# Patient Record
Sex: MALE | Race: WHITE | Employment: FULL TIME | URBAN - METROPOLITAN AREA
[De-identification: names, ages, dates, MRNs, and addresses within clinical notes are randomized per-mention and may not be internally consistent; named-entity substitution may affect disease eponyms.]

---

## 2021-08-25 ENCOUNTER — APPOINTMENT (OUTPATIENT)
Dept: GENERAL RADIOLOGY | Age: 51
End: 2021-08-25
Attending: EMERGENCY MEDICINE
Payer: COMMERCIAL

## 2021-08-25 ENCOUNTER — HOSPITAL ENCOUNTER (OUTPATIENT)
Age: 51
Setting detail: OBSERVATION
Discharge: HOME | End: 2021-08-26
Attending: STUDENT IN AN ORGANIZED HEALTH CARE EDUCATION/TRAINING PROGRAM | Admitting: INTERNAL MEDICINE
Payer: COMMERCIAL

## 2021-08-25 DIAGNOSIS — R07.9 CHEST PAIN, UNSPECIFIED TYPE: Primary | ICD-10-CM

## 2021-08-25 DIAGNOSIS — R06.02 SOB (SHORTNESS OF BREATH): ICD-10-CM

## 2021-08-25 LAB
ALBUMIN SERPL-MCNC: 3.8 G/DL (ref 3.5–5)
ALBUMIN/GLOB SERPL: 1.2 {RATIO} (ref 1.1–2.2)
ALP SERPL-CCNC: 83 U/L (ref 45–117)
ALT SERPL-CCNC: 55 U/L (ref 12–78)
ANION GAP SERPL CALC-SCNC: 5 MMOL/L (ref 5–15)
AST SERPL W P-5'-P-CCNC: 17 U/L (ref 15–37)
BASOPHILS # BLD: 0.1 K/UL (ref 0–0.1)
BASOPHILS NFR BLD: 1 % (ref 0–1)
BILIRUB SERPL-MCNC: 0.4 MG/DL (ref 0.2–1)
BUN SERPL-MCNC: 11 MG/DL (ref 6–20)
BUN/CREAT SERPL: 13 (ref 12–20)
CA-I BLD-MCNC: 9.1 MG/DL (ref 8.5–10.1)
CHLORIDE SERPL-SCNC: 105 MMOL/L (ref 97–108)
CO2 SERPL-SCNC: 27 MMOL/L (ref 21–32)
CREAT SERPL-MCNC: 0.84 MG/DL (ref 0.7–1.3)
DIFFERENTIAL METHOD BLD: ABNORMAL
EOSINOPHIL # BLD: 0.4 K/UL (ref 0–0.4)
EOSINOPHIL NFR BLD: 5 % (ref 0–7)
ERYTHROCYTE [DISTWIDTH] IN BLOOD BY AUTOMATED COUNT: 11.9 % (ref 11.5–14.5)
GLOBULIN SER CALC-MCNC: 3.2 G/DL (ref 2–4)
GLUCOSE BLD STRIP.AUTO-MCNC: 357 MG/DL (ref 65–117)
GLUCOSE SERPL-MCNC: 326 MG/DL (ref 65–100)
HCT VFR BLD AUTO: 42.2 % (ref 36.6–50.3)
HGB BLD-MCNC: 14.4 G/DL (ref 12.1–17)
IMM GRANULOCYTES # BLD AUTO: 0 K/UL (ref 0–0.04)
IMM GRANULOCYTES NFR BLD AUTO: 1 % (ref 0–0.5)
LYMPHOCYTES # BLD: 2.6 K/UL (ref 0.8–3.5)
LYMPHOCYTES NFR BLD: 32 % (ref 12–49)
MCH RBC QN AUTO: 31 PG (ref 26–34)
MCHC RBC AUTO-ENTMCNC: 34.1 G/DL (ref 30–36.5)
MCV RBC AUTO: 90.9 FL (ref 80–99)
MONOCYTES # BLD: 0.8 K/UL (ref 0–1)
MONOCYTES NFR BLD: 10 % (ref 5–13)
NEUTS SEG # BLD: 4.1 K/UL (ref 1.8–8)
NEUTS SEG NFR BLD: 51 % (ref 32–75)
NRBC # BLD: 0 K/UL (ref 0–0.01)
NRBC BLD-RTO: 0 PER 100 WBC
PERFORMED BY, TECHID: ABNORMAL
PLATELET # BLD AUTO: 199 K/UL (ref 150–400)
PMV BLD AUTO: 11 FL (ref 8.9–12.9)
POTASSIUM SERPL-SCNC: 4.2 MMOL/L (ref 3.5–5.1)
PROT SERPL-MCNC: 7 G/DL (ref 6.4–8.2)
RBC # BLD AUTO: 4.64 M/UL (ref 4.1–5.7)
SODIUM SERPL-SCNC: 137 MMOL/L (ref 136–145)
TROPONIN I SERPL-MCNC: <0.05 NG/ML
TROPONIN I SERPL-MCNC: <0.05 NG/ML
WBC # BLD AUTO: 7.9 K/UL (ref 4.1–11.1)

## 2021-08-25 PROCEDURE — 93005 ELECTROCARDIOGRAM TRACING: CPT

## 2021-08-25 PROCEDURE — 99284 EMERGENCY DEPT VISIT MOD MDM: CPT

## 2021-08-25 PROCEDURE — 74011250637 HC RX REV CODE- 250/637: Performed by: STUDENT IN AN ORGANIZED HEALTH CARE EDUCATION/TRAINING PROGRAM

## 2021-08-25 PROCEDURE — 84484 ASSAY OF TROPONIN QUANT: CPT

## 2021-08-25 PROCEDURE — 99218 HC RM OBSERVATION: CPT

## 2021-08-25 PROCEDURE — 85025 COMPLETE CBC W/AUTO DIFF WBC: CPT

## 2021-08-25 PROCEDURE — 74011636637 HC RX REV CODE- 636/637: Performed by: INTERNAL MEDICINE

## 2021-08-25 PROCEDURE — 80053 COMPREHEN METABOLIC PANEL: CPT

## 2021-08-25 PROCEDURE — 82962 GLUCOSE BLOOD TEST: CPT

## 2021-08-25 PROCEDURE — 36415 COLL VENOUS BLD VENIPUNCTURE: CPT

## 2021-08-25 PROCEDURE — 71046 X-RAY EXAM CHEST 2 VIEWS: CPT

## 2021-08-25 PROCEDURE — 65270000029 HC RM PRIVATE

## 2021-08-25 PROCEDURE — 74011250637 HC RX REV CODE- 250/637: Performed by: INTERNAL MEDICINE

## 2021-08-25 RX ORDER — MAGNESIUM SULFATE 100 %
4 CRYSTALS MISCELLANEOUS AS NEEDED
Status: DISCONTINUED | OUTPATIENT
Start: 2021-08-25 | End: 2021-08-27 | Stop reason: HOSPADM

## 2021-08-25 RX ORDER — ATORVASTATIN CALCIUM 20 MG/1
20 TABLET, FILM COATED ORAL DAILY
Status: DISCONTINUED | OUTPATIENT
Start: 2021-08-26 | End: 2021-08-27 | Stop reason: HOSPADM

## 2021-08-25 RX ORDER — METFORMIN HYDROCHLORIDE 500 MG/1
1000 TABLET ORAL 2 TIMES DAILY WITH MEALS
Status: DISCONTINUED | OUTPATIENT
Start: 2021-08-26 | End: 2021-08-27 | Stop reason: HOSPADM

## 2021-08-25 RX ORDER — PANTOPRAZOLE SODIUM 40 MG/1
40 TABLET, DELAYED RELEASE ORAL
Status: DISCONTINUED | OUTPATIENT
Start: 2021-08-26 | End: 2021-08-27 | Stop reason: HOSPADM

## 2021-08-25 RX ORDER — IBUPROFEN 800 MG/1
800 TABLET ORAL
Status: COMPLETED | OUTPATIENT
Start: 2021-08-25 | End: 2021-08-25

## 2021-08-25 RX ORDER — SODIUM CHLORIDE 0.9 % (FLUSH) 0.9 %
5-40 SYRINGE (ML) INJECTION AS NEEDED
Status: DISCONTINUED | OUTPATIENT
Start: 2021-08-25 | End: 2021-08-27 | Stop reason: HOSPADM

## 2021-08-25 RX ORDER — ATORVASTATIN CALCIUM 20 MG/1
20 TABLET, FILM COATED ORAL DAILY
COMMUNITY

## 2021-08-25 RX ORDER — SODIUM CHLORIDE 0.9 % (FLUSH) 0.9 %
5-40 SYRINGE (ML) INJECTION EVERY 8 HOURS
Status: DISCONTINUED | OUTPATIENT
Start: 2021-08-25 | End: 2021-08-27 | Stop reason: HOSPADM

## 2021-08-25 RX ORDER — ENOXAPARIN SODIUM 100 MG/ML
40 INJECTION SUBCUTANEOUS EVERY 24 HOURS
Status: DISCONTINUED | OUTPATIENT
Start: 2021-08-25 | End: 2021-08-27 | Stop reason: HOSPADM

## 2021-08-25 RX ORDER — TRAZODONE HYDROCHLORIDE 50 MG/1
50 TABLET ORAL
Status: DISCONTINUED | OUTPATIENT
Start: 2021-08-25 | End: 2021-08-27 | Stop reason: HOSPADM

## 2021-08-25 RX ORDER — OMEPRAZOLE 20 MG/1
20 CAPSULE, DELAYED RELEASE ORAL DAILY
COMMUNITY

## 2021-08-25 RX ORDER — INSULIN LISPRO 100 [IU]/ML
INJECTION, SOLUTION INTRAVENOUS; SUBCUTANEOUS
Status: DISCONTINUED | OUTPATIENT
Start: 2021-08-25 | End: 2021-08-27 | Stop reason: HOSPADM

## 2021-08-25 RX ORDER — METFORMIN HYDROCHLORIDE 1000 MG/1
1000 TABLET ORAL 2 TIMES DAILY WITH MEALS
COMMUNITY

## 2021-08-25 RX ORDER — DEXTROSE 50 % IN WATER (D50W) INTRAVENOUS SYRINGE
25-50 AS NEEDED
Status: DISCONTINUED | OUTPATIENT
Start: 2021-08-25 | End: 2021-08-27 | Stop reason: HOSPADM

## 2021-08-25 RX ORDER — LANOLIN ALCOHOL/MO/W.PET/CERES
3 CREAM (GRAM) TOPICAL
Status: DISCONTINUED | OUTPATIENT
Start: 2021-08-25 | End: 2021-08-27 | Stop reason: HOSPADM

## 2021-08-25 RX ADMIN — IBUPROFEN 800 MG: 800 TABLET, FILM COATED ORAL at 16:33

## 2021-08-25 RX ADMIN — MELATONIN TAB 3 MG 3 MG: 3 TAB at 22:10

## 2021-08-25 RX ADMIN — TRAZODONE HYDROCHLORIDE 50 MG: 50 TABLET ORAL at 22:10

## 2021-08-25 RX ADMIN — Medication 10 ML: at 22:16

## 2021-08-25 RX ADMIN — INSULIN LISPRO 15 UNITS: 100 INJECTION, SOLUTION INTRAVENOUS; SUBCUTANEOUS at 22:15

## 2021-08-25 NOTE — ED PROVIDER NOTES
EMERGENCY DEPARTMENT HISTORY AND PHYSICAL EXAM      Date: 8/25/2021  Patient Name: Shaina Iglesias      History of Presenting Illness     Chief Complaint   Patient presents with    Chest Pain       History Provided By: Patient    HPI: Shaina Iglesias, 46 y.o. male with past medical history of DM, HLD, and chronic smoking who presents emerged department with 1 week worsening chest pain. Pain is episodic, feels like tightness, midsternal, nothing specific makes it better or worse with no associated nausea, vomiting or diaphoresis. However, patient reports that he has been having worsening exertional dyspnea over the last 4 days. Patient reports that he has strong family history of cardiac disease. Of note, patient reports that he has been under a lot of stress with his job. He recently translocated from [place] to [place] to work for his current company. Patient is denying fever, coughing, night sweats or chills. He did receive his Covid vaccines and there are no concerns for infectious etiology. There are no other complaints, changes, or physical findings at this time. PCP: No primary care provider on file. Past History     Past Medical History:  Past Medical History:   Diagnosis Date    Diabetes ([facility] [address]     Endocrine disease        Past Surgical History:  History reviewed. No pertinent surgical history. Family History:  History reviewed. No pertinent family history. Social History:  Social History     Tobacco Use    Smoking status: Current Some Day Smoker    Smokeless tobacco: Never Used   Substance Use Topics    Alcohol use: Not on file    Drug use: Not on file       Allergies:  No Known Allergies      Review of Systems     Review of Systems   Constitutional: Negative for activity change, chills and fever. HENT: Negative for congestion and facial swelling. Eyes: Negative for discharge and visual disturbance. Respiratory: Positive for chest tightness and shortness of breath. Cardiovascular: Negative for chest pain and leg swelling. Gastrointestinal: Negative for abdominal pain, diarrhea and vomiting. Genitourinary: Negative for dysuria and flank pain. Musculoskeletal: Negative for back pain and gait problem. Skin: Negative for rash and wound. Neurological: Negative for dizziness, weakness and headaches. Physical Exam     Physical Exam  Constitutional:       Appearance: Normal appearance. HENT:      Head: Normocephalic and atraumatic. Mouth/Throat:      Mouth: Mucous membranes are moist.      Pharynx: Oropharynx is clear. Eyes:      Extraocular Movements: Extraocular movements intact. Conjunctiva/sclera: Conjunctivae normal.   Cardiovascular:      Rate and Rhythm: Normal rate and regular rhythm. Pulmonary:      Effort: Pulmonary effort is normal.      Breath sounds: Normal breath sounds. Abdominal:      General: Abdomen is flat. Palpations: Abdomen is soft. Tenderness: There is no abdominal tenderness. Musculoskeletal:         General: No tenderness or deformity. Cervical back: Normal range of motion and neck supple. Skin:     General: Skin is warm and dry. Neurological:      General: No focal deficit present. Mental Status: He is alert and oriented to person, place, and time.          Lab and Diagnostic Study Results     Labs -     Recent Results (from the past 12 hour(s))   CBC WITH AUTOMATED DIFF    Collection Time: 08/25/21 12:50 PM   Result Value Ref Range    WBC 7.9 4.1 - 11.1 K/uL    RBC 4.64 4.10 - 5.70 M/uL    HGB 14.4 12.1 - 17.0 g/dL    HCT 42.2 36.6 - 50.3 %    MCV 90.9 80.0 - 99.0 FL    MCH 31.0 26.0 - 34.0 PG    MCHC 34.1 30.0 - 36.5 g/dL    RDW 11.9 11.5 - 14.5 %    PLATELET 645 508 - 664 K/uL    MPV 11.0 8.9 - 12.9 FL    NRBC 0.0 0.0  WBC    ABSOLUTE NRBC 0.00 0.00 - 0.01 K/uL    NEUTROPHILS 51 32 - 75 %    LYMPHOCYTES 32 12 - 49 %    MONOCYTES 10 5 - 13 %    EOSINOPHILS 5 0 - 7 %    BASOPHILS 1 0 - 1 % IMMATURE GRANULOCYTES 1 (H) 0 - 0.5 %    ABS. NEUTROPHILS 4.1 1.8 - 8.0 K/UL    ABS. LYMPHOCYTES 2.6 0.8 - 3.5 K/UL    ABS. MONOCYTES 0.8 0.0 - 1.0 K/UL    ABS. EOSINOPHILS 0.4 0.0 - 0.4 K/UL    ABS. BASOPHILS 0.1 0.0 - 0.1 K/UL    ABS. IMM. GRANS. 0.0 0.00 - 0.04 K/UL    DF AUTOMATED     METABOLIC PANEL, COMPREHENSIVE    Collection Time: 08/25/21 12:50 PM   Result Value Ref Range    Sodium 137 136 - 145 mmol/L    Potassium 4.2 3.5 - 5.1 mmol/L    Chloride 105 97 - 108 mmol/L    CO2 27 21 - 32 mmol/L    Anion gap 5 5 - 15 mmol/L    Glucose 326 (H) 65 - 100 mg/dL    BUN 11 6 - 20 mg/dL    Creatinine 0.84 0.70 - 1.30 mg/dL    BUN/Creatinine ratio 13 12 - 20      GFR est AA >60 >60 ml/min/1.73m2    GFR est non-AA >60 >60 ml/min/1.73m2    Calcium 9.1 8.5 - 10.1 mg/dL    Bilirubin, total 0.4 0.2 - 1.0 mg/dL    AST (SGOT) 17 15 - 37 U/L    ALT (SGPT) 55 12 - 78 U/L    Alk. phosphatase 83 45 - 117 U/L    Protein, total 7.0 6.4 - 8.2 g/dL    Albumin 3.8 3.5 - 5.0 g/dL    Globulin 3.2 2.0 - 4.0 g/dL    A-G Ratio 1.2 1.1 - 2.2     TROPONIN I    Collection Time: 08/25/21 12:50 PM   Result Value Ref Range    Troponin-I, Qt. <0.05 <0.05 ng/mL       Radiologic Studies -   [unfilled]  CT Results  (Last 48 hours)    None        CXR Results  (Last 48 hours)               08/25/21 1309  XR CHEST PA LAT Final result    Impression:  No acute pulmonary process. Narrative:  Chest, 2 views, 8/25/2021       History: Shortness of breath. Comparison: None. Findings: The cardiac silhouette is within normal limits. The lungs are   adequately expanded. No hydrostatic edema is present. No focal consolidation,   pleural effusions or pneumothorax is identified. No acute osseous findings are   definitively seen. Medical Decision Making and ED Course   - I am the first and primary provider for this patient AND AM THE PRIMARY PROVIDER OF RECORD.     - I reviewed the vital signs, available nursing notes, past medical history, past surgical history, family history and social history. - Initial assessment performed. The patients presenting problems have been discussed, and the staff are in agreement with the care plan formulated and outlined with them. I have encouraged them to ask questions as they arise throughout their visit. Vital Signs-Reviewed the patient's vital signs. Patient Vitals for the past 12 hrs:   Temp Pulse Resp BP SpO2   08/25/21 1244 98.1 °F (36.7 °C) 86 18 135/79 97 %     EKG:  Sinus rhythm rate of 80, normal NV, QRS, and QT intervals. Normal axis, no ST deviation, no signs of blocks or dysrhythmia. There is no pathological T wave changes. There is baseline artifact. Records Reviewed: Nursing Notes    Provider Notes (Medical Decision Making):   66-year-old male with comorbidities for acute coronary syndrome presented emerge department with chest pain and exertional shortness of breath. Patient examination did not show signs concerned that with CHF or COPD. Patient risk factor including diabetes, hypertension, chronic smoking, stressful job, strong family history with him at high risk of acute coronary syndrome. Will get blood work and patient will be admitted to the hospital.  Patient symptoms also are not concerning for dissection. Patient does have equal pulses bilaterally and does not have any focal neurological deficits. Additionally, pain does not radiate to his back. ED Course:   Patient is work-up has been reviewed. His troponin is not elevated. His CBC and chemistry within normal except for hyperglycemia. Chest x-ray did not show an acute pathology. Patient was discussed with admitting physician. HEART SCORE:   History:  Moderately Suspicious  ECG: Normal  Age: Greater than 45 years - Less than 65 years  Risk Factors: Greater than or equal to 3 risk factors, or history of atherosclerotic disease  Troponin: Less than or equal to normal limit   HEART Score Total : 4     Management   Scores 0-3: 0.9-1.7% risk of adverse cardiac event. In the HEART Score study, these patients were discharged (0.99% in the retrospective study, 1.7% in the prospective study)   Scores 4-6: 12-16.6% risk of adverse cardiac event. In the HEART Score study, these patients were admitted to the hospital. (11.6% retrospective, 16.6% prospective)   Scores ? 7: 50-65% risk of adverse cardiac event. In the HEART Score study, these patients were candidates for early invasive measures. (65.2% retrospective, 50.1% prospective)   A MACE (Major Adverse Cardiac Event) was defined as all-cause mortality, myocardial infarction, or coronary revascularization. Original/Primary Reference  · Era Allen, Ankit TRAN. Chest pain in the emergency room: value of the HEART score. Net Heart J. 2008;16(6):191-6. Validation  · Joshua Gonzalez, Ankit TRAN, et al. A prospective validation of the HEART score for chest pain patients at the emergency department. Int J Cardiol. 2013;168(3):2153-8. · Joshua Gonzalez, Chares Cogan, et al. Chest pain in the emergency room: a multicenter validation of the HEART Score. Crit Pathw Cardiol. 2010;9(3):164-9. · Aayna SCOTT, Nnamdi RF, Akanksha HERNANDEZ, et al. The HEART Pathway Randomized Controlled Trial One Year Outcomes. [facility] 2018; Disposition     Disposition: Condition stable  Admitted to Observation Unit the case was discussed with the admitting physician Dr. Clarissa Raymundo    Admitted     Diagnosis     Clinical Impression:   1. Chest pain, unspecified type    2. SOB (shortness of breath)        Attestations: Myron Sweet MD    Please note that this dictation was completed with [employer], the computer voice recognition software. Quite often unanticipated grammatical, syntax, homophones, and other interpretive errors are inadvertently transcribed by the computer software. Please disregard these errors.   Please excuse any errors that have escaped final proofreading. Thank you.

## 2021-08-25 NOTE — PROGRESS NOTES
8/25/21. Pt informed of SON/OBS notice, verbalized understanding, & signed. Copy to pt, copy in chart, & original to HIM for scanning into EMR. D/C Plan is home. Pt lives alone, uses no DME, & plans to drive self home upon discharge ( car in parking lot).

## 2021-08-25 NOTE — ROUTINE PROCESS
TRANSFER - OUT REPORT:    Verbal report given to Benjamin (name) on Cheryl Valdovinos  being transferred to [facility](unit) for routine progression of care       Report consisted of patients Situation, Background, Assessment and   Recommendations(SBAR). Information from the following report(s) SBAR and ED Summary was reviewed with the receiving nurse. Lines:   Peripheral IV 08/25/21 Left Antecubital (Active)   Site Assessment Clean, dry, & intact 08/25/21 1254   Phlebitis Assessment 0 08/25/21 1254   Infiltration Assessment 0 08/25/21 1254   Dressing Status Clean, dry, & intact 08/25/21 1254   Dressing Type Transparent 08/25/21 1254   Hub Color/Line Status Pink;Flushed 08/25/21 1254   Action Taken Blood drawn;Dressing reinforced 08/25/21 1254        Opportunity for questions and clarification was provided.       Patient transported with:   [employer]

## 2021-08-25 NOTE — H&P
History and Physical    Patient: Debbi Diaz MRN: [id number]  SSN: [id number]    YOB: 1970  Age: 46 y.o. Sex: male      Subjective:      Debbi Diaz is a 46 y.o. male who has a history of diabetes endocrine disease presented with a complaint of chest pain and epigastric pain nonradiating not associated with nausea no diaphoresis. Patient has been under stress at work lately    Past Medical History:   Diagnosis Date    Diabetes [facility])     Endocrine disease      History reviewed. No pertinent surgical history. History reviewed. No pertinent family history. Social History     Tobacco Use    Smoking status: Current Some Day Smoker    Smokeless tobacco: Never Used   Substance Use Topics    Alcohol use: Not on file      Prior to Admission medications    Not on File        No Known Allergies    Review of Systems:  A comprehensive review of systems was negative except for that written in the History of Present Illness. Objective:     Vitals:    08/25/21 1244   BP: 135/79   Pulse: 86   Resp: 18   Temp: 98.1 °F (36.7 °C)   SpO2: 97%   Weight: 129.3 kg (285 lb)   Height: 6' 5\" (1.956 m)        Physical Exam:  General:  Alert, cooperative, no distress, appears stated age. Eyes:  Conjunctivae/corneas clear. PERRL, EOMs intact. Fundi benign   Ears:  Normal TMs and external ear canals both ears. Nose: Nares normal. Septum midline. Mucosa normal. No drainage or sinus tenderness. Mouth/Throat: Lips, mucosa, and tongue normal. Teeth and gums normal.   Neck: Supple, symmetrical, trachea midline, no adenopathy, thyroid: no enlargment/tenderness/nodules, no carotid bruit and no JVD. Back:   Symmetric, no curvature. ROM normal. No CVA tenderness. Lungs:   Clear to auscultation bilaterally. Heart:  Regular rate and rhythm, S1, S2 normal, no murmur, click, rub or gallop. Abdomen:   Soft, non-tender. Bowel sounds normal. No masses,  No organomegaly.    Extremities: Extremities normal, atraumatic, no cyanosis or edema. Pulses: 2+ and symmetric all extremities. Skin: Skin color, texture, turgor normal. No rashes or lesions   Lymph nodes: Cervical, supraclavicular, and axillary nodes normal.   Neurologic: CNII-XII intact. Normal strength, sensation and reflexes throughout. Recent Results (from the past 24 hour(s))   CBC WITH AUTOMATED DIFF    Collection Time: 08/25/21 12:50 PM   Result Value Ref Range    WBC 7.9 4.1 - 11.1 K/uL    RBC 4.64 4.10 - 5.70 M/uL    HGB 14.4 12.1 - 17.0 g/dL    HCT 42.2 36.6 - 50.3 %    MCV 90.9 80.0 - 99.0 FL    MCH 31.0 26.0 - 34.0 PG    MCHC 34.1 30.0 - 36.5 g/dL    RDW 11.9 11.5 - 14.5 %    PLATELET 777 092 - 454 K/uL    MPV 11.0 8.9 - 12.9 FL    NRBC 0.0 0.0  WBC    ABSOLUTE NRBC 0.00 0.00 - 0.01 K/uL    NEUTROPHILS 51 32 - 75 %    LYMPHOCYTES 32 12 - 49 %    MONOCYTES 10 5 - 13 %    EOSINOPHILS 5 0 - 7 %    BASOPHILS 1 0 - 1 %    IMMATURE GRANULOCYTES 1 (H) 0 - 0.5 %    ABS. NEUTROPHILS 4.1 1.8 - 8.0 K/UL    ABS. LYMPHOCYTES 2.6 0.8 - 3.5 K/UL    ABS. MONOCYTES 0.8 0.0 - 1.0 K/UL    ABS. EOSINOPHILS 0.4 0.0 - 0.4 K/UL    ABS. BASOPHILS 0.1 0.0 - 0.1 K/UL    ABS. IMM. GRANS. 0.0 0.00 - 0.04 K/UL    DF AUTOMATED     METABOLIC PANEL, COMPREHENSIVE    Collection Time: 08/25/21 12:50 PM   Result Value Ref Range    Sodium 137 136 - 145 mmol/L    Potassium 4.2 3.5 - 5.1 mmol/L    Chloride 105 97 - 108 mmol/L    CO2 27 21 - 32 mmol/L    Anion gap 5 5 - 15 mmol/L    Glucose 326 (H) 65 - 100 mg/dL    BUN 11 6 - 20 mg/dL    Creatinine 0.84 0.70 - 1.30 mg/dL    BUN/Creatinine ratio 13 12 - 20      GFR est AA >60 >60 ml/min/1.73m2    GFR est non-AA >60 >60 ml/min/1.73m2    Calcium 9.1 8.5 - 10.1 mg/dL    Bilirubin, total 0.4 0.2 - 1.0 mg/dL    AST (SGOT) 17 15 - 37 U/L    ALT (SGPT) 55 12 - 78 U/L    Alk.  phosphatase 83 45 - 117 U/L    Protein, total 7.0 6.4 - 8.2 g/dL    Albumin 3.8 3.5 - 5.0 g/dL    Globulin 3.2 2.0 - 4.0 g/dL    A-G Ratio 1.2 1.1 - 2.2 TROPONIN I    Collection Time: 08/25/21 12:50 PM   Result Value Ref Range    Troponin-I, Qt. <0.05 <0.05 ng/mL       Assessment:     Hospital Problems  Never Reviewed        Codes Class Noted POA    Chest pain ICD-10-CM: R07.9  ICD-9-CM: 786.50  8/25/2021 Unknown          ACS  Possible reflux esophagitis  Diabetes  Obesity    Plan:     Admit patient on telemetry serial cardiac enzymes and Cardiolite stress test cardiology consult this was discussed with the patient    Signed By: Sallie Martinez MD     August 25, 2021

## 2021-08-25 NOTE — Clinical Note
Patient Class[de-identified] OBSERVATION [969]   Type of Bed: Medical [8]   Reason for Observation: Chest Pain   Admitting Diagnosis: Chest pain [909186]   Admitting Physician: Lisa Kingsley   Attending Physician: Lisa Kingsley

## 2021-08-26 ENCOUNTER — APPOINTMENT (OUTPATIENT)
Dept: NUCLEAR MEDICINE | Age: 51
End: 2021-08-26
Attending: INTERNAL MEDICINE
Payer: COMMERCIAL

## 2021-08-26 ENCOUNTER — APPOINTMENT (OUTPATIENT)
Dept: NON INVASIVE DIAGNOSTICS | Age: 51
End: 2021-08-26
Attending: INTERNAL MEDICINE
Payer: COMMERCIAL

## 2021-08-26 VITALS
TEMPERATURE: 98.6 F | RESPIRATION RATE: 16 BRPM | DIASTOLIC BLOOD PRESSURE: 82 MMHG | OXYGEN SATURATION: 96 % | WEIGHT: 285 LBS | HEIGHT: 77 IN | HEART RATE: 72 BPM | SYSTOLIC BLOOD PRESSURE: 121 MMHG | BODY MASS INDEX: 33.65 KG/M2

## 2021-08-26 LAB
ATRIAL RATE: 71 BPM
ATRIAL RATE: 80 BPM
CALCULATED P AXIS, ECG09: 58 DEGREES
CALCULATED P AXIS, ECG09: 75 DEGREES
CALCULATED R AXIS, ECG10: 20 DEGREES
CALCULATED R AXIS, ECG10: 21 DEGREES
CALCULATED T AXIS, ECG11: 9 DEGREES
DIAGNOSIS, 93000: NORMAL
DIAGNOSIS, 93000: NORMAL
GLUCOSE BLD STRIP.AUTO-MCNC: 220 MG/DL (ref 65–117)
GLUCOSE BLD STRIP.AUTO-MCNC: 242 MG/DL (ref 65–117)
GLUCOSE BLD STRIP.AUTO-MCNC: 262 MG/DL (ref 65–117)
P-R INTERVAL, ECG05: 148 MS
P-R INTERVAL, ECG05: 154 MS
PERFORMED BY, TECHID: ABNORMAL
Q-T INTERVAL, ECG07: 356 MS
Q-T INTERVAL, ECG07: 384 MS
QRS DURATION, ECG06: 88 MS
QRS DURATION, ECG06: 88 MS
QTC CALCULATION (BEZET), ECG08: 410 MS
QTC CALCULATION (BEZET), ECG08: 417 MS
STRESS ANGINA INDEX: 0
STRESS BASELINE DIAS BP: 93 MMHG
STRESS BASELINE HR: 93 BPM
STRESS BASELINE SYS BP: 130 MMHG
STRESS ESTIMATED WORKLOAD: 9 METS
STRESS EXERCISE DUR MIN: 7.35 MIN:SEC
STRESS PERCENT HR ACHIEVED: 85 %
STRESS POST PEAK HR: 144 BPM
STRESS SR DUKE TREADMILL SCORE: 7
STRESS ST DEPRESSION: 0 MM
STRESS ST ELEVATION: 0 MM
STRESS STAGE 1 BP: NORMAL MMHG
STRESS STAGE 1 DURATION: NORMAL MIN:SEC
STRESS STAGE 1 HR: 113 BPM
STRESS STAGE 2 BP: NORMAL MMHG
STRESS STAGE 2 DURATION: NORMAL MIN:SEC
STRESS STAGE 2 HR: 123 BPM
STRESS STAGE 3 BP: NORMAL MMHG
STRESS STAGE 3 DURATION: NORMAL MIN:SEC
STRESS STAGE 3 HR: 131 BPM
STRESS STAGE RECOVERY 1 DURATION: NORMAL MIN:SEC
STRESS STAGE RECOVERY 1 HR: 127 BPM
STRESS STAGE RECOVERY 2 BP: NORMAL MMHG
STRESS STAGE RECOVERY 2 DURATION: NORMAL MIN:SEC
STRESS STAGE RECOVERY 2 HR: 115 BPM
STRESS STAGE RECOVERY 3 BP: NORMAL MMHG
STRESS STAGE RECOVERY 3 DURATION: NORMAL MIN:SEC
STRESS STAGE RECOVERY 3 HR: 108 BPM
STRESS STAGE RECOVERY 4 BP: NORMAL MMHG
STRESS STAGE RECOVERY 4 COMMENTS: NORMAL
STRESS STAGE RECOVERY 4 DURATION: NORMAL MIN:SEC
STRESS STAGE RECOVERY 4 HR: 101 BPM
STRESS TARGET HR: 169 BPM
TROPONIN I SERPL-MCNC: <0.05 NG/ML
TROPONIN I SERPL-MCNC: <0.05 NG/ML
VENTRICULAR RATE, ECG03: 71 BPM
VENTRICULAR RATE, ECG03: 80 BPM

## 2021-08-26 PROCEDURE — 74011636637 HC RX REV CODE- 636/637: Performed by: INTERNAL MEDICINE

## 2021-08-26 PROCEDURE — 84484 ASSAY OF TROPONIN QUANT: CPT

## 2021-08-26 PROCEDURE — 93005 ELECTROCARDIOGRAM TRACING: CPT

## 2021-08-26 PROCEDURE — 99218 HC RM OBSERVATION: CPT

## 2021-08-26 PROCEDURE — 78452 HT MUSCLE IMAGE SPECT MULT: CPT

## 2021-08-26 PROCEDURE — 74011250637 HC RX REV CODE- 250/637: Performed by: INTERNAL MEDICINE

## 2021-08-26 PROCEDURE — 36415 COLL VENOUS BLD VENIPUNCTURE: CPT

## 2021-08-26 PROCEDURE — 82962 GLUCOSE BLOOD TEST: CPT

## 2021-08-26 RX ORDER — PANTOPRAZOLE SODIUM 40 MG/1
40 TABLET, DELAYED RELEASE ORAL
Qty: 30 TABLET | Refills: 0 | Status: SHIPPED | OUTPATIENT
Start: 2021-08-27

## 2021-08-26 RX ORDER — METOPROLOL TARTRATE 25 MG/1
12.5 TABLET, FILM COATED ORAL 2 TIMES DAILY
Qty: 60 TABLET | Refills: 0 | Status: SHIPPED | OUTPATIENT
Start: 2021-08-26

## 2021-08-26 RX ORDER — TRAZODONE HYDROCHLORIDE 50 MG/1
50 TABLET ORAL
Qty: 30 TABLET | Refills: 0 | Status: SHIPPED | OUTPATIENT
Start: 2021-08-26

## 2021-08-26 RX ADMIN — Medication 10 ML: at 06:39

## 2021-08-26 RX ADMIN — INSULIN LISPRO 6 UNITS: 100 INJECTION, SOLUTION INTRAVENOUS; SUBCUTANEOUS at 17:36

## 2021-08-26 RX ADMIN — INSULIN LISPRO 6 UNITS: 100 INJECTION, SOLUTION INTRAVENOUS; SUBCUTANEOUS at 12:41

## 2021-08-26 RX ADMIN — PANTOPRAZOLE SODIUM 40 MG: 40 TABLET, DELAYED RELEASE ORAL at 09:31

## 2021-08-26 RX ADMIN — INSULIN LISPRO 6 UNITS: 100 INJECTION, SOLUTION INTRAVENOUS; SUBCUTANEOUS at 07:30

## 2021-08-26 RX ADMIN — ATORVASTATIN CALCIUM 20 MG: 20 TABLET, FILM COATED ORAL at 09:31

## 2021-08-26 RX ADMIN — Medication 10 ML: at 14:00

## 2021-08-26 NOTE — MED STUDENT NOTES
Progress Note    Patient: Dawn Cabot MRN: [id number]  SSN: [id number]    YOB: 1970  Age: 46 y.o. Sex: male      Admit Date: 8/25/2021    LOS: 1 day     Subjective:     Mr. Ashlee Ambrose is a 46year old male who came to the hospital for chest pain and dyspnea on exertion. He has been under stress at work recently and felt multiple pati concerns were mounting. Today he is in no new pain but reports continued chest pressure. Four sets of troponin were negative. He has a history of GERD that is managed with omeprazole and TUMS prn; also has a history of DM and is a smoker. This morning's glucose is above 200. Patient reports he's been taking NSAIDs over the past few weeks. He admits to lower leg edema when standing for short periods of time and tingling sensation when he walks moderate distances. He denies current shortness of breath, N/V, diaphoresis, fever, chills, abdominal pain, change in appetite, or recent reflux. Past Medical History:   Diagnosis Date    Diabetes [facility])     Endocrine disease    History reviewed. No pertinent surgical history. History reviewed. No pertinent family history.       Tobacco Use: High Risk    Smoking Tobacco Use: Current Some Day Smoker    Smokeless Tobacco Use: Never Used        Alcohol Use:     Frequency of Alcohol Consumption:     Average Number of Drinks:     Frequency of Binge Drinking:      Current Facility-Administered Medications   Medication Dose Route Frequency    technetium sestamibi (CARDIOLITE) injection 33 millicurie  33 millicurie IntraVENous ONCE    sodium chloride (NS) flush 5-40 mL  5-40 mL IntraVENous Q8H    sodium chloride (NS) flush 5-40 mL  5-40 mL IntraVENous PRN    enoxaparin (LOVENOX) injection 40 mg  40 mg SubCUTAneous Q24H    pantoprazole (PROTONIX) tablet 40 mg  40 mg Oral ACB    traZODone (DESYREL) tablet 50 mg  50 mg Oral QHS PRN    melatonin tablet 3 mg  3 mg Oral QHS PRN    atorvastatin (LIPITOR) tablet 20 mg  20 mg Oral DAILY    metFORMIN (GLUCOPHAGE) tablet 1,000 mg  1,000 mg Oral BID WITH MEALS    insulin lispro (HUMALOG) injection   SubCUTAneous AC&HS    glucose chewable tablet 16 g  4 Tablet Oral PRN    glucagon (GLUCAGEN) injection 1 mg  1 mg IntraMUSCular PRN    dextrose (D50W) injection syrg 12.5-25 g  25-50 mL IntraVENous PRN       Objective:   Review of Systems   Constitutional: Negative for chills, diaphoresis, fever and malaise/fatigue. Respiratory: Negative for shortness of breath and wheezing. Cardiovascular: Negative for chest pain and palpitations. Gastrointestinal: Negative for abdominal pain, constipation, diarrhea, heartburn, nausea and vomiting. Musculoskeletal: Negative for joint pain and myalgias. Skin: Negative. Neurological: Negative for dizziness, weakness and headaches. Vitals:    08/26/21 0059 08/26/21 0304 08/26/21 0751 08/26/21 0800   BP: 136/88 (!) 148/93 (!) 125/96    Pulse: 82 68 75 73   Resp: 16 13 13    Temp: 98.4 °F (36.9 °C) 97.8 °F (36.6 °C) 97.7 °F (36.5 °C)    SpO2: 96% 98% 96%    Weight:       Height:            Intake and Output:  Current Shift: No intake/output data recorded. Last three shifts: No intake/output data recorded.     Physical Exam:   GENERAL: alert, cooperative, no distress, appears stated age    Lab/Data Review:  CMP:   Lab Results   Component Value Date/Time     08/25/2021 12:50 PM    K 4.2 08/25/2021 12:50 PM     08/25/2021 12:50 PM    CO2 27 08/25/2021 12:50 PM    AGAP 5 08/25/2021 12:50 PM     (H) 08/25/2021 12:50 PM    BUN 11 08/25/2021 12:50 PM    CREA 0.84 08/25/2021 12:50 PM    GFRAA >60 08/25/2021 12:50 PM    GFRNA >60 08/25/2021 12:50 PM    CA 9.1 08/25/2021 12:50 PM    ALB 3.8 08/25/2021 12:50 PM    TP 7.0 08/25/2021 12:50 PM    GLOB 3.2 08/25/2021 12:50 PM    AGRAT 1.2 08/25/2021 12:50 PM    ALT 55 08/25/2021 12:50 PM     CBC:   Lab Results   Component Value Date/Time    WBC 7.9 08/25/2021 12:50 PM    HGB 14.4 08/25/2021 12:50 PM    HCT 42.2 08/25/2021 12:50 PM     08/25/2021 12:50 PM     All Cardiac Markers in the last 24 hours:   Lab Results   Component Value Date/Time    TROIQ <0.05 08/26/2021 05:35 AM    TROIQ <0.05 08/26/2021 01:30 AM    TROIQ <0.05 08/25/2021 08:00 PM    Sheryle Ted <0.05 08/25/2021 12:50 PM     Recent Glucose Results:   Lab Results   Component Value Date/Time     (H) 08/25/2021 12:50 PM          Assessment:     Active Problems:    Chest pain (8/25/2021)    GERD    Plan:     Await EKG and stress test  Patient advised to cut back on ibuprofen use and cigarettes  F/u for diabetes management on d/c    Signed By: Tejas Osborne     August 26, 2021          *ATTENTION:  This note has been created by a medical student for educational purposes only. Please do not refer to the content of this note for clinical decision-making, billing, or other purposes. Please see attending physicians note to obtain clinical information on this patient. *

## 2021-08-26 NOTE — DISCHARGE INSTRUCTIONS
Please buy Lidocaine patch over the counter. Pain medications will be discussed during follow up appointment.  Per Stephanie Acharya MD

## 2021-08-26 NOTE — CONSULTS
Consult    Patient: Mariel Casiano MRN: [id number]  SSN: [id number]    YOB: 1970  Age: 46 y.o. Sex: male      Subjective:      Mariel Casiano is a 46 y.o. male who is being seen for chest pain. Patient works as a  in [employer] in [facility]. He has a history of diabetes he smoked for about 25 years and he has a history of hypercholesterolemia. He is compliant with his medications. Anyhow he has been working more often and recently doing some physical work that he is not used to do. He fell on his back after he slipped in the water and landed on his back about 2 weeks ago. He has been working in a very hot and warm environment with a temperature up in the 140s as he said he was to the point where he is getting dizzy and lightheaded but he never passed out. He has been having this pain that is worse when he lay flat and it is better as he said. Denied having recent change in his weight or lower extremity swelling. Denied nausea, vomiting or excessive sweating. This morning he denied having any chest pain but is more in epigastric area. He has been eating ibuprofen 5 to 6 tablets every day for the last 2 weeks. He has a history of reflux and indigestion and ulcers. Past Medical History:   Diagnosis Date    Diabetes [facility])     Endocrine disease      History reviewed. No pertinent surgical history. History reviewed. No pertinent family history.   Social History     Tobacco Use    Smoking status: Current Some Day Smoker    Smokeless tobacco: Never Used   Substance Use Topics    Alcohol use: Not on file      Current Facility-Administered Medications   Medication Dose Route Frequency Provider Last Rate Last Admin    sodium chloride (NS) flush 5-40 mL  5-40 mL IntraVENous Q8H Alden Silva MD   10 mL at 08/26/21 0639    sodium chloride (NS) flush 5-40 mL  5-40 mL IntraVENous PRN Ghazala WOODRUFF MD        enoxaparin (LOVENOX) injection 40 mg  40 mg SubCUTAneous Q24H Cher WOODRUFF MD        pantoprazole (PROTONIX) tablet 40 mg  40 mg Oral ACB Alden Silva MD        traZODone (DESYREL) tablet 50 mg  50 mg Oral QHS PRN Cher WOODRUFF MD   50 mg at 08/25/21 2210    melatonin tablet 3 mg  3 mg Oral QHS PRN Cher WOODRUFF MD   3 mg at 08/25/21 2210    atorvastatin (LIPITOR) tablet 20 mg  20 mg Oral DAILY Cher WOODRUFF MD        metFORMIN (GLUCOPHAGE) tablet 1,000 mg  1,000 mg Oral BID WITH MEALS Cher WOODRUFF MD        insulin lispro (HUMALOG) injection   SubCUTAneous AC&HS Fabrizio Jacobson MD   15 Units at 08/25/21 2215    glucose chewable tablet 16 g  4 Tablet Oral PRN Fabrizio Jacobson MD        glucagon (GLUCAGEN) injection 1 mg  1 mg IntraMUSCular PRN Cher WOODRUFF MD        dextrose (D50W) injection syrg 12.5-25 g  25-50 mL IntraVENous PRN Fabrizio Jacobson MD            No Known Allergies    Review of Systems:  A comprehensive review of systems was negative except for that written in the History of Present Illness. Objective:     Vitals:    08/25/21 1244 08/26/21 0059 08/26/21 0304 08/26/21 0751   BP: 135/79 136/88 (!) 148/93 (!) 125/96   Pulse: 86 82 68 75   Resp: 18 16 13 13   Temp: 98.1 °F (36.7 °C) 98.4 °F (36.9 °C) 97.8 °F (36.6 °C) 97.7 °F (36.5 °C)   SpO2: 97% 96% 98% 96%   Weight: 129.3 kg (285 lb)      Height: 6' 5\" (1.956 m)           Physical Exam:  General:  Alert, cooperative, no distress, appears stated age. Eyes:  Conjunctivae/corneas clear. PERRL, EOMs intact. Fundi benign   Ears:  Normal TMs and external ear canals both ears. Nose: Nares normal. Septum midline. Mucosa normal. No drainage or sinus tenderness. Mouth/Throat: Lips, mucosa, and tongue normal. Teeth and gums normal.   Neck: Supple, symmetrical, trachea midline, no adenopathy, thyroid: no enlargment/tenderness/nodules, no carotid bruit and no JVD. Back:   Symmetric, no curvature. ROM normal. No CVA tenderness.    Lungs:   Clear to auscultation bilaterally. Heart:  Regular rate and rhythm, S1, S2 normal, no murmur, click, rub or gallop. Abdomen:   Soft, non-tender. Bowel sounds normal. No masses,  No organomegaly. Extremities: Extremities normal, atraumatic, no cyanosis or edema. Pulses: 2+ and symmetric all extremities. Skin: Skin color, texture, turgor normal. No rashes or lesions   Lymph nodes: Cervical, supraclavicular, and axillary nodes normal.   Neurologic: CNII-XII intact. Normal strength, sensation and reflexes throughout. Assessment:     Hospital Problems  Never Reviewed        Codes Class Noted POA    Chest pain ICD-10-CM: R07.9  ICD-9-CM: 786.50  8/25/2021 Unknown            Patient is a 51-year-old white male with:  1. Chest pain, atypical for angina  2. Diabetes mellitus  3. Hypercholesterolemia   4. Recent fall  5. Nicotine dependence  6. Obesity    Plan:     4 sets of cardiac markers are negative. His glucose was greater than 300. His potassium is 4.2. Patient has been consuming ibuprofen on a regular basis for the last few days. He did not smoke in the last 3 days but he smoked for about 25 years. He was counseled regarding the importance of smoking cessation. Blood pressures well controlled and appears to be euvolemic. Abdomen is tender to palpation. Patient was advised to cut back on NSAID intake. Currently on atorvastatin and pantoprazole. He is on a sliding scale, Metformin. Given his risk factor profile we will proceed with an echocardiogram and Cardiolite stress test.    Thank you  For involving me in this nice patient care.     Signed By: Bri Oviedo MD     August 26, 2021

## 2021-08-26 NOTE — DISCHARGE SUMMARY
Discharge Summary     Patient: Debbi Diaz MRN: [id number]  SSN: [id number]    YOB: 1970  Age: 46 y.o. Sex: male       Admit Date: 8/25/2021    Discharge Date: 8/26/2021      Admission Diagnoses: Chest pain [R07.9]    Discharge Diagnoses:   Problem List as of 8/26/2021 Never Reviewed        Codes Class Noted - Resolved    Chest pain ICD-10-CM: R07.9  ICD-9-CM: 786.50  8/25/2021 - Present               Discharge Condition: Good    Hospital Course: 46years old patient needed for l chest pain and epigastric pain. Patient was seen by cardiologist and had Cardiolite stress test which was negative for ischemia.   He was discharged for follow-up with primary medical physician    Consults: Cardiology    Significant Diagnostic Studies: labs:   Recent Results (from the past 24 hour(s))   TROPONIN I    Collection Time: 08/25/21  8:00 PM   Result Value Ref Range    Troponin-I, Qt. <0.05 <0.05 ng/mL   GLUCOSE, POC    Collection Time: 08/25/21  9:25 PM   Result Value Ref Range    Glucose (POC) 357 (H) 65 - 117 mg/dL    Performed by Collette Crumbly    TROPONIN I    Collection Time: 08/26/21  1:30 AM   Result Value Ref Range    Troponin-I, Qt. <0.05 <0.05 ng/mL   TROPONIN I    Collection Time: 08/26/21  5:35 AM   Result Value Ref Range    Troponin-I, Qt. <0.05 <0.05 ng/mL   GLUCOSE, POC    Collection Time: 08/26/21  7:57 AM   Result Value Ref Range    Glucose (POC) 220 (H) 65 - 117 mg/dL    Performed by BIN SHELLEY    EKG, 12 LEAD, INITIAL    Collection Time: 08/26/21  8:19 AM   Result Value Ref Range    Ventricular Rate 71 BPM    Atrial Rate 71 BPM    P-R Interval 148 ms    QRS Duration 88 ms    Q-T Interval 384 ms    QTC Calculation (Bezet) 417 ms    Calculated P Axis 58 degrees    Calculated R Axis 20 degrees    Diagnosis       Sinus rhythm with marked sinus arrhythmia  Otherwise normal ECG  When compared with ECG of 25-AUG-2021 12:43, (Unconfirmed)  No significant change was found  Confirmed by Calixto Oswaldo ([id number]) on 8/26/2021 4:25:36 PM     NUCLEAR CARDIAC STRESS TEST    Collection Time: 08/26/21 12:06 PM   Result Value Ref Range    Target  bpm    Baseline HR 93 bpm    Baseline  mmHg    Percent HR 85 %    Post peak  bpm    Stress Base Diastolic BP 93 mmHg    Stress Stage 1 Duration 3min min:sec    Stress Stage 1  bpm    Stress Stage 1 /77 mmHg    Stress Stage 2 Duration 3min min:sec    Stress Stage 2  bpm    Stress Stage 2 /82 mmHg    Stress Stage 3 Duration 1:35min min:sec    Stress Stage 3  bpm    Stress Stage 3 /83 mmHg    Recovery Stage 1 Duration 1min min:sec    Recovery Stage 1  bpm    Recovery Stage 2 Duration 2min min:sec    Recovery Stage 2  bpm    Recovery Stage 2 /80 mmHg    Recovery Stage 3 Duration 3min min:sec    Recovery Stage 3  bpm    Recovery Stage 3 /82 mmHg    Recovery Stage 4 Duration 5min min:sec    Recovery Stage 4  bpm    Recovery Stage 4 /107 mmHg    Recovery Stage 4 Comments 7 min /99     STRESS SR LUO TREADMILL SCORE 7     Estimated workload 9 METS    ST Elevation (mm) 0 mm    ST Depression (mm) 0 mm    Angina Index 0     Exercise duration time 7.35 min:sec   GLUCOSE, POC    Collection Time: 08/26/21 12:21 PM   Result Value Ref Range    Glucose (POC) 242 (H) 65 - 117 mg/dL    Performed by [address] [place] [address], POC    Collection Time: 08/26/21  3:27 PM   Result Value Ref Range    Glucose (POC) 262 (H) 65 - 117 mg/dL    [facility]          XR CHEST PA LAT   Final Result   No acute pulmonary process. Disposition: home    Discharge Medications:   Current Discharge Medication List      START taking these medications    Details   pantoprazole (PROTONIX) 40 mg tablet Take 1 Tablet by mouth Daily (before breakfast). Qty: 30 Tablet, Refills: 0      traZODone (DESYREL) 50 mg tablet Take 1 Tablet by mouth nightly as needed for Sleep.   Qty: 30 Tablet, Refills: 0 metoprolol tartrate (LOPRESSOR) 25 mg tablet Take 0.5 Tablets by mouth two (2) times a day. Qty: 60 Tablet, Refills: 0         CONTINUE these medications which have NOT CHANGED    Details   omeprazole (PRILOSEC) 20 mg capsule Take 20 mg by mouth daily. metFORMIN (GLUCOPHAGE) 1,000 mg tablet Take 1,000 mg by mouth two (2) times daily (with meals). atorvastatin (LIPITOR) 20 mg tablet Take 20 mg by mouth daily.              Activity: Activity as tolerated  Diet: Cardiac Diet  Wound Care: None needed  35 minutes discharge time    Follow-up Appointments   Procedures    FOLLOW UP VISIT Appointment in: 3 - 5 Days     Standing Status:   Standing     Number of Occurrences:   1     Order Specific Question:   Appointment in     Answer:   3 - 5 Days       Signed By: Genella Severin, MD     August 26, 2021

## 2022-03-19 PROBLEM — R07.9 CHEST PAIN: Status: ACTIVE | Noted: 2021-08-25

## 2022-10-06 ENCOUNTER — TRANSCRIBE ORDER (OUTPATIENT)
Dept: SCHEDULING | Age: 52
End: 2022-10-06

## 2022-10-06 DIAGNOSIS — M79.621 PAIN OF RIGHT UPPER ARM: ICD-10-CM

## 2022-10-06 DIAGNOSIS — M89.561: Primary | ICD-10-CM

## 2023-04-21 DIAGNOSIS — M79.621 PAIN OF RIGHT UPPER ARM: ICD-10-CM

## 2023-04-21 DIAGNOSIS — M89.561: Primary | ICD-10-CM
